# Patient Record
Sex: FEMALE | Race: WHITE | NOT HISPANIC OR LATINO | URBAN - METROPOLITAN AREA
[De-identification: names, ages, dates, MRNs, and addresses within clinical notes are randomized per-mention and may not be internally consistent; named-entity substitution may affect disease eponyms.]

---

## 2021-05-17 ENCOUNTER — OFFICE VISIT (OUTPATIENT)
Dept: URBAN - METROPOLITAN AREA CLINIC 54 | Facility: CLINIC | Age: 81
End: 2021-05-17
Payer: MEDICARE

## 2021-05-17 ENCOUNTER — OFFICE VISIT (OUTPATIENT)
Dept: URBAN - METROPOLITAN AREA CLINIC 54 | Facility: CLINIC | Age: 81
End: 2021-05-17

## 2021-05-17 VITALS
WEIGHT: 132 LBS | TEMPERATURE: 98.2 F | BODY MASS INDEX: 22.53 KG/M2 | HEART RATE: 91 BPM | SYSTOLIC BLOOD PRESSURE: 103 MMHG | HEIGHT: 64 IN | DIASTOLIC BLOOD PRESSURE: 80 MMHG

## 2021-05-17 DIAGNOSIS — R15.2 FECAL URGENCY: ICD-10-CM

## 2021-05-17 DIAGNOSIS — R19.7 DIARRHEA: ICD-10-CM

## 2021-05-17 DIAGNOSIS — K92.1 MELENA: ICD-10-CM

## 2021-05-17 DIAGNOSIS — R15.9 FULL INCONTINENCE OF FECES: ICD-10-CM

## 2021-05-17 DIAGNOSIS — K52.3 INDETERMINATE COLITIS: ICD-10-CM

## 2021-05-17 PROBLEM — 142251000119102: Status: ACTIVE | Noted: 2021-05-17

## 2021-05-17 PROCEDURE — 99214 OFFICE O/P EST MOD 30 MIN: CPT | Performed by: INTERNAL MEDICINE

## 2021-05-17 RX ORDER — METHENAMINE HIPPURATE 1000 MG/1
1 TABLET TABLET ORAL TWICE A DAY
Status: ACTIVE | COMMUNITY

## 2021-05-17 RX ORDER — TIOTROPIUM BROMIDE INHALATION SPRAY 3.12 UG/1
2 PUFFS SPRAY, METERED RESPIRATORY (INHALATION) ONCE A DAY
Status: ACTIVE | COMMUNITY

## 2021-05-17 RX ORDER — LOSARTAN POTASSIUM 25 MG/1
TAKE 1 TABLET (25 MG) BY ORAL ROUTE ONCE DAILY TABLET, FILM COATED ORAL 1
Qty: 0 | Refills: 0 | Status: ON HOLD | COMMUNITY
Start: 1900-01-01

## 2021-05-17 RX ORDER — LATANOPROST/PF 0.005 %
DROPS OPHTHALMIC (EYE)
Qty: 0 | Refills: 0 | Status: ON HOLD | COMMUNITY
Start: 1900-01-01

## 2021-05-17 RX ORDER — DEXLANSOPRAZOLE 60 MG/1
TAKE 1 CAPSULE (60 MG) BY ORAL ROUTE ONCE DAILY CAPSULE, DELAYED RELEASE ORAL 1
Qty: 0 | Refills: 0 | Status: ON HOLD | COMMUNITY
Start: 1900-01-01

## 2021-05-17 RX ORDER — DULAGLUTIDE 0.75 MG/.5ML
AS DIRECTED INJECTION, SOLUTION SUBCUTANEOUS
Status: ACTIVE | COMMUNITY

## 2021-05-17 RX ORDER — TOFACITINIB 5 MG/1
TAKE 1 TABLET (5 MG) BY ORAL ROUTE 2 TIMES PER DAY TABLET, FILM COATED ORAL 2
Qty: 0 | Refills: 0 | Status: ON HOLD | COMMUNITY
Start: 1900-01-01

## 2021-05-17 RX ORDER — CETIRIZINE HYDROCHLORIDE 10 MG/1
TABLET, FILM COATED ORAL
Qty: 0 | Refills: 0 | Status: ON HOLD | COMMUNITY
Start: 1900-01-01

## 2021-05-17 RX ORDER — MUPIROCIN 20 MG/G
OINTMENT TOPICAL
Qty: 0 | Refills: 0 | Status: ON HOLD | COMMUNITY
Start: 1900-01-01

## 2021-05-17 RX ORDER — GLUC/MSM/COLGN2/HYAL/ANTIARTH3 375-375-20
TAKE 1 CAPSULE BY ORAL ROUTE 2 TIMES A DAY TABLET ORAL 2
Qty: 0 | Refills: 0 | Status: ON HOLD | COMMUNITY
Start: 1900-01-01

## 2021-05-17 RX ORDER — APREMILAST 30 MG/1
1 TABLET TABLET, FILM COATED ORAL TWICE A DAY
Status: ACTIVE | COMMUNITY

## 2021-05-17 RX ORDER — SERTRALINE 50 MG/1
1 TABLET TABLET, FILM COATED ORAL ONCE A DAY
Status: ACTIVE | COMMUNITY

## 2021-05-17 RX ORDER — L. ACIDOPHILUS/L.BULGARICUS 1MM CELL
AS DIRECTED TABLET ORAL
Status: ACTIVE | COMMUNITY

## 2021-05-17 RX ORDER — ASPIRIN 325 MG/1
TAKE 1 TABLET (325 MG) BY ORAL ROUTE ONCE DAILY TABLET ORAL 1
Qty: 0 | Refills: 0 | Status: DISCONTINUED | COMMUNITY
Start: 1900-01-01

## 2021-05-17 RX ORDER — PREDNISONE 5 MG/1
TABLET ORAL
Qty: 0 | Refills: 0 | Status: ACTIVE | COMMUNITY
Start: 1900-01-01

## 2021-05-17 RX ORDER — COLESEVELAM HYDROCHLORIDE 625 MG/1
3 TABLETS WITH MEALS TABLET, FILM COATED ORAL TWICE A DAY
Qty: 180 | OUTPATIENT
Start: 2021-05-17

## 2021-05-17 RX ORDER — DIGOXIN 250 UG/1
TABLET ORAL
Qty: 0 | Refills: 0 | Status: ACTIVE | COMMUNITY
Start: 1900-01-01

## 2021-05-17 RX ORDER — LINAGLIPTIN 5 MG/1
TAKE 1 TABLET (5 MG) BY ORAL ROUTE ONCE DAILY TABLET, FILM COATED ORAL 1
Qty: 0 | Refills: 0 | Status: ON HOLD | COMMUNITY
Start: 1900-01-01

## 2021-05-17 RX ORDER — ESTRADIOL 10 UG/1
INSERT VAGINAL
Qty: 0 | Refills: 0 | Status: ON HOLD | COMMUNITY
Start: 1900-01-01

## 2021-05-17 RX ORDER — MEMANTINE HYDROCHLORIDE 28 MG/1
CAPSULE, EXTENDED RELEASE ORAL
Qty: 0 | Refills: 0 | Status: ON HOLD | COMMUNITY
Start: 1900-01-01

## 2021-05-17 RX ORDER — EZETIMIBE 10 MG/1
TAKE 1 TABLET (10 MG) BY ORAL ROUTE ONCE DAILY TABLET ORAL 1
Qty: 0 | Refills: 0 | Status: ACTIVE | COMMUNITY
Start: 1900-01-01

## 2021-05-17 RX ORDER — ZOLEDRONIC ACID 5 MG/100ML
INJECTION, SOLUTION INTRAVENOUS
Qty: 1 | Refills: 0 | Status: ON HOLD | COMMUNITY
Start: 1900-01-01

## 2021-05-17 RX ORDER — L-METHYLFOLATE-ALGAE-VIT B12-B6 CAP 3-90.314-2-35 MG 3-90.314-2-35 MG
TAKE 1 CAPSULE BY ORAL ROUTE DAILY CAP ORAL 1
Qty: 0 | Refills: 0 | Status: ON HOLD | COMMUNITY
Start: 1900-01-01

## 2021-05-17 NOTE — HPI-TODAY'S VISIT:
Patient reports that she was diagnosed with Covid in October 2020. Pt reports that she has not been doing dwell.  Pt reports being sent to a NH afterwards.  Pt reports that she had covid PNA. X ray revealed aortic aneurysm. Had OLGA in hospital and C dif infectio. Placed on vancomycin taper.  Pt reports that this did help. Pt reports that she was placed on a few fiber and probiotic as well.  Pt reports that she was placed on florinex.   Pt reports that by the time was out of nursing home had ongoing trouble Pt was going up to 8xd

## 2021-05-22 LAB
C DIFFICILE TOXINS A+B, EIA: NEGATIVE
CALPROTECTIN, FECAL: 37
CAMPYLOBACTER CULTURE: (no result)
E COLI SHIGA TOXIN EIA: NEGATIVE
Lab: (no result)
OCCULT BLOOD, FECAL, IA: NEGATIVE
OVA + PARASITE EXAM: (no result)
SALMONELLA/SHIGELLA SCREEN: (no result)

## 2021-06-15 ENCOUNTER — ERX REFILL RESPONSE (OUTPATIENT)
Dept: URBAN - METROPOLITAN AREA CLINIC 54 | Facility: CLINIC | Age: 81
End: 2021-06-15

## 2021-06-15 RX ORDER — COLESEVELAM HYDROCHLORIDE 625 MG/1
3 TABLETS WITH MEALS TABLET, FILM COATED ORAL TWICE A DAY
Qty: 180 | Refills: 0

## 2021-06-21 ENCOUNTER — WEB ENCOUNTER (OUTPATIENT)
Dept: URBAN - METROPOLITAN AREA CLINIC 54 | Facility: CLINIC | Age: 81
End: 2021-06-21

## 2021-06-21 ENCOUNTER — DASHBOARD ENCOUNTERS (OUTPATIENT)
Age: 81
End: 2021-06-21

## 2021-06-21 ENCOUNTER — OFFICE VISIT (OUTPATIENT)
Dept: URBAN - METROPOLITAN AREA CLINIC 54 | Facility: CLINIC | Age: 81
End: 2021-06-21
Payer: MEDICARE

## 2021-06-21 VITALS
DIASTOLIC BLOOD PRESSURE: 83 MMHG | BODY MASS INDEX: 25.61 KG/M2 | WEIGHT: 150 LBS | TEMPERATURE: 97.3 F | HEART RATE: 86 BPM | HEIGHT: 64 IN | SYSTOLIC BLOOD PRESSURE: 128 MMHG

## 2021-06-21 DIAGNOSIS — B94.8 SEQUELAE OF OTHER SPECIFIED INFECTIOUS AND PARASITIC DISEASES: ICD-10-CM

## 2021-06-21 DIAGNOSIS — K52.3 INDETERMINATE COLITIS: ICD-10-CM

## 2021-06-21 DIAGNOSIS — D50.0 IRON DEFICIENCY ANEMIA DUE TO CHRONIC BLOOD LOSS: ICD-10-CM

## 2021-06-21 DIAGNOSIS — R53.83 OTHER FATIGUE: ICD-10-CM

## 2021-06-21 PROBLEM — 720349002: Status: ACTIVE | Noted: 2021-06-21

## 2021-06-21 PROBLEM — 724556004: Status: ACTIVE | Noted: 2021-06-21

## 2021-06-21 PROBLEM — 235746007: Status: ACTIVE | Noted: 2021-05-17

## 2021-06-21 PROCEDURE — 99213 OFFICE O/P EST LOW 20 MIN: CPT | Performed by: INTERNAL MEDICINE

## 2021-06-21 RX ORDER — LINAGLIPTIN 5 MG/1
TAKE 1 TABLET (5 MG) BY ORAL ROUTE ONCE DAILY TABLET, FILM COATED ORAL 1
Qty: 0 | Refills: 0 | Status: ON HOLD | COMMUNITY
Start: 1900-01-01

## 2021-06-21 RX ORDER — ESTRADIOL 10 UG/1
INSERT VAGINAL
Qty: 0 | Refills: 0 | Status: ON HOLD | COMMUNITY
Start: 1900-01-01

## 2021-06-21 RX ORDER — TIOTROPIUM BROMIDE INHALATION SPRAY 3.12 UG/1
2 PUFFS SPRAY, METERED RESPIRATORY (INHALATION) ONCE A DAY
Status: ACTIVE | COMMUNITY

## 2021-06-21 RX ORDER — L-METHYLFOLATE-ALGAE-VIT B12-B6 CAP 3-90.314-2-35 MG 3-90.314-2-35 MG
TAKE 1 CAPSULE BY ORAL ROUTE DAILY CAP ORAL 1
Qty: 0 | Refills: 0 | Status: ON HOLD | COMMUNITY
Start: 1900-01-01

## 2021-06-21 RX ORDER — METHENAMINE HIPPURATE 1000 MG/1
1 TABLET TABLET ORAL TWICE A DAY
Status: ACTIVE | COMMUNITY

## 2021-06-21 RX ORDER — TOFACITINIB 5 MG/1
TAKE 1 TABLET (5 MG) BY ORAL ROUTE 2 TIMES PER DAY TABLET, FILM COATED ORAL 2
Qty: 0 | Refills: 0 | Status: ON HOLD | COMMUNITY
Start: 1900-01-01

## 2021-06-21 RX ORDER — SERTRALINE 50 MG/1
1 TABLET TABLET, FILM COATED ORAL ONCE A DAY
Status: ACTIVE | COMMUNITY

## 2021-06-21 RX ORDER — ZOLEDRONIC ACID 5 MG/100ML
INJECTION, SOLUTION INTRAVENOUS
Qty: 1 | Refills: 0 | Status: ON HOLD | COMMUNITY
Start: 1900-01-01

## 2021-06-21 RX ORDER — MUPIROCIN 20 MG/G
OINTMENT TOPICAL
Qty: 0 | Refills: 0 | Status: ON HOLD | COMMUNITY
Start: 1900-01-01

## 2021-06-21 RX ORDER — DEXLANSOPRAZOLE 60 MG/1
TAKE 1 CAPSULE (60 MG) BY ORAL ROUTE ONCE DAILY CAPSULE, DELAYED RELEASE ORAL 1
Qty: 0 | Refills: 0 | Status: ON HOLD | COMMUNITY
Start: 1900-01-01

## 2021-06-21 RX ORDER — COLESEVELAM HYDROCHLORIDE 625 MG/1
3 TABLETS WITH MEALS TABLET, FILM COATED ORAL TWICE A DAY
Qty: 180 | Refills: 0 | Status: ACTIVE | COMMUNITY

## 2021-06-21 RX ORDER — DULAGLUTIDE 0.75 MG/.5ML
AS DIRECTED INJECTION, SOLUTION SUBCUTANEOUS
Status: ACTIVE | COMMUNITY

## 2021-06-21 RX ORDER — L. ACIDOPHILUS/L.BULGARICUS 1MM CELL
AS DIRECTED TABLET ORAL
Status: ACTIVE | COMMUNITY

## 2021-06-21 RX ORDER — GLUC/MSM/COLGN2/HYAL/ANTIARTH3 375-375-20
TAKE 1 CAPSULE BY ORAL ROUTE 2 TIMES A DAY TABLET ORAL 2
Qty: 0 | Refills: 0 | Status: ON HOLD | COMMUNITY
Start: 1900-01-01

## 2021-06-21 RX ORDER — DIGOXIN 250 UG/1
TABLET ORAL
Qty: 0 | Refills: 0 | Status: ACTIVE | COMMUNITY
Start: 1900-01-01

## 2021-06-21 RX ORDER — PREDNISONE 5 MG/1
TABLET ORAL
Qty: 0 | Refills: 0 | Status: ACTIVE | COMMUNITY
Start: 1900-01-01

## 2021-06-21 RX ORDER — MEMANTINE HYDROCHLORIDE 28 MG/1
CAPSULE, EXTENDED RELEASE ORAL
Qty: 0 | Refills: 0 | Status: ON HOLD | COMMUNITY
Start: 1900-01-01

## 2021-06-21 RX ORDER — CETIRIZINE HYDROCHLORIDE 10 MG/1
TABLET, FILM COATED ORAL
Qty: 0 | Refills: 0 | Status: ON HOLD | COMMUNITY
Start: 1900-01-01

## 2021-06-21 RX ORDER — APREMILAST 30 MG/1
1 TABLET TABLET, FILM COATED ORAL TWICE A DAY
Status: ACTIVE | COMMUNITY

## 2021-06-21 RX ORDER — LOSARTAN POTASSIUM 25 MG/1
TAKE 1 TABLET (25 MG) BY ORAL ROUTE ONCE DAILY TABLET, FILM COATED ORAL 1
Qty: 0 | Refills: 0 | Status: ON HOLD | COMMUNITY
Start: 1900-01-01

## 2021-06-21 RX ORDER — LATANOPROST/PF 0.005 %
DROPS OPHTHALMIC (EYE)
Qty: 0 | Refills: 0 | Status: ON HOLD | COMMUNITY
Start: 1900-01-01

## 2021-06-21 RX ORDER — EZETIMIBE 10 MG/1
TAKE 1 TABLET (10 MG) BY ORAL ROUTE ONCE DAILY TABLET ORAL 1
Qty: 0 | Refills: 0 | Status: ACTIVE | COMMUNITY
Start: 1900-01-01

## 2021-06-21 NOTE — HPI-TODAY'S VISIT:
Patient reports that she was diagnosed with Covid in October 2020. Pt reports that she has not been doing dwell.  Pt reports being sent to a NH afterwards.  Pt reports that she had covid PNA. X ray revealed aortic aneurysm. Had OLGA in hospital and C dif infectio. Placed on vancomycin taper.  Pt reports that this did help. Pt reports that she was placed on a few fiber and probiotic as well.  Pt reports that she was placed on florinex.   Pt reports that by the time was out of nursing home had ongoing trouble Pt was going up to 8xd  6-21-21 Pt reports that the diarrhea is better on the welchol. Still has some times that she will have that frequency of stool small volume

## 2021-06-22 LAB
A/G RATIO: 1.3
ALBUMIN: 4.1
ALKALINE PHOSPHATASE: 82
ALT (SGPT): 14
AST (SGOT): 17
BASO (ABSOLUTE): 0.1
BASOS: 1
BILIRUBIN, TOTAL: 0.2
BUN/CREATININE RATIO: 21
BUN: 18
C-REACTIVE PROTEIN, QUANT: 15
CALCIUM: 9.5
CARBON DIOXIDE, TOTAL: 25
CHLORIDE: 99
CREATININE: 0.84
EGFR IF AFRICN AM: 75
EGFR IF NONAFRICN AM: 65
EOS (ABSOLUTE): 0.4
EOS: 3
FERRITIN, SERUM: 254
GLOBULIN, TOTAL: 3.1
GLUCOSE: 114
HEMATOCRIT: 41
HEMATOLOGY COMMENTS:: (no result)
HEMOGLOBIN: 13.4
IMMATURE CELLS: (no result)
IMMATURE GRANS (ABS): 0.1
IMMATURE GRANULOCYTES: 1
IRON BIND.CAP.(TIBC): 286
IRON SATURATION: 14
IRON: 40
LYMPHS (ABSOLUTE): 2.1
LYMPHS: 20
MAGNESIUM: 1.9
MCH: 27.6
MCHC: 32.7
MCV: 85
MONOCYTES(ABSOLUTE): 0.9
MONOCYTES: 9
NEUTROPHILS (ABSOLUTE): 7.1
NEUTROPHILS: 66
NRBC: (no result)
PLATELETS: 281
POTASSIUM: 4.1
PROTEIN, TOTAL: 7.2
RBC: 4.85
RDW: 14.9
SODIUM: 137
UIBC: 246
WBC: 10.6

## 2021-06-29 ENCOUNTER — TELEPHONE ENCOUNTER (OUTPATIENT)
Dept: URBAN - METROPOLITAN AREA CLINIC 54 | Facility: CLINIC | Age: 81
End: 2021-06-29

## 2021-07-08 ENCOUNTER — ERX REFILL RESPONSE (OUTPATIENT)
Dept: URBAN - METROPOLITAN AREA CLINIC 54 | Facility: CLINIC | Age: 81
End: 2021-07-08

## 2021-07-08 RX ORDER — COLESEVELAM HYDROCHLORIDE 625 MG/1
TAKE 3 TABLETS BY MOUTH TWICE A DAY WITH MEALS TABLET, COATED ORAL
Qty: 180 TABLET | Refills: 1 | OUTPATIENT

## 2021-07-08 RX ORDER — COLESEVELAM HYDROCHLORIDE 625 MG/1
3 TABLETS WITH MEALS TABLET, FILM COATED ORAL TWICE A DAY
Qty: 180 | Refills: 0 | OUTPATIENT

## 2021-07-28 ENCOUNTER — TELEPHONE ENCOUNTER (OUTPATIENT)
Dept: URBAN - METROPOLITAN AREA CLINIC 54 | Facility: CLINIC | Age: 81
End: 2021-07-28

## 2021-07-28 RX ORDER — COLESEVELAM HYDROCHLORIDE 625 MG/1
3 TABLETS WITH MEALS ORALLY TWICE A DAY TABLET, COATED ORAL
Qty: 540 | Refills: 0

## 2021-10-04 ENCOUNTER — TELEPHONE ENCOUNTER (OUTPATIENT)
Dept: URBAN - METROPOLITAN AREA CLINIC 54 | Facility: CLINIC | Age: 81
End: 2021-10-04

## 2021-10-04 RX ORDER — COLESEVELAM HYDROCHLORIDE 625 MG/1
3 TABLETS WITH MEALS ORALLY TWICE A DAY TABLET, COATED ORAL
Qty: 540 | Refills: 0

## 2021-10-05 ENCOUNTER — ERX REFILL RESPONSE (OUTPATIENT)
Dept: URBAN - METROPOLITAN AREA CLINIC 54 | Facility: CLINIC | Age: 81
End: 2021-10-05

## 2021-10-05 RX ORDER — COLESEVELAM HYDROCHLORIDE 625 MG/1
3 TABLETS WITH MEALS ORALLY TWICE A DAY TABLET, COATED ORAL
Qty: 540 | Refills: 0 | OUTPATIENT

## 2021-10-05 RX ORDER — COLESEVELAM HYDROCHLORIDE 625 MG/1
TAKE 3 TABLETS TWICE A DAY WITH MEALS TABLET, COATED ORAL
Qty: 540 TABLET | Refills: 4 | OUTPATIENT

## 2021-12-13 ENCOUNTER — TELEPHONE ENCOUNTER (OUTPATIENT)
Dept: URBAN - NONMETROPOLITAN AREA CLINIC 4 | Facility: CLINIC | Age: 81
End: 2021-12-13

## 2021-12-13 RX ORDER — COLESEVELAM HYDROCHLORIDE 625 MG/1
3 TABLETS WITH MEALS TABLET, COATED ORAL TWICE A DAY
Qty: 540 TABLET | Refills: 2

## 2023-05-16 ENCOUNTER — TELEPHONE ENCOUNTER (OUTPATIENT)
Dept: URBAN - NONMETROPOLITAN AREA CLINIC 4 | Facility: CLINIC | Age: 83
End: 2023-05-16

## 2023-05-16 RX ORDER — COLESEVELAM HYDROCHLORIDE 625 MG/1
3 TABLETS WITH MEALS TABLET, COATED ORAL TWICE A DAY
Qty: 540 TABLET | Refills: 2